# Patient Record
Sex: FEMALE | Race: BLACK OR AFRICAN AMERICAN
[De-identification: names, ages, dates, MRNs, and addresses within clinical notes are randomized per-mention and may not be internally consistent; named-entity substitution may affect disease eponyms.]

---

## 2018-01-17 ENCOUNTER — HOSPITAL ENCOUNTER (INPATIENT)
Dept: HOSPITAL 92 - ERS | Age: 78
LOS: 1 days | Discharge: HOME | DRG: 87 | End: 2018-01-18
Attending: SURGERY | Admitting: SURGERY
Payer: MEDICARE

## 2018-01-17 ENCOUNTER — HOSPITAL ENCOUNTER (EMERGENCY)
Dept: HOSPITAL 18 - NAV ERS | Age: 78
Discharge: ANOTHER HOSPITAL | End: 2018-01-17
Payer: MEDICARE

## 2018-01-17 VITALS — BODY MASS INDEX: 37.6 KG/M2

## 2018-01-17 DIAGNOSIS — Z79.82: ICD-10-CM

## 2018-01-17 DIAGNOSIS — S06.6X0A: Primary | ICD-10-CM

## 2018-01-17 DIAGNOSIS — I10: ICD-10-CM
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
## 2018-01-17 DIAGNOSIS — Z91.19: ICD-10-CM

## 2018-01-17 LAB
ALBUMIN SERPL BCG-MCNC: 4 G/DL (ref 3.4–4.8)
ALP SERPL-CCNC: 84 U/L (ref 40–150)
ALT SERPL W P-5'-P-CCNC: 15 U/L (ref 8–55)
ANION GAP SERPL CALC-SCNC: 15 MMOL/L (ref 10–20)
APTT PPP: 29.5 SEC (ref 22.9–36.1)
AST SERPL-CCNC: 21 U/L (ref 5–34)
BASOPHILS # BLD AUTO: 0.1 THOU/UL (ref 0–0.2)
BASOPHILS NFR BLD AUTO: 0.5 % (ref 0–1)
BILIRUB SERPL-MCNC: 0.9 MG/DL (ref 0.2–1.2)
BUN SERPL-MCNC: 11 MG/DL (ref 9.8–20.1)
CALCIUM SERPL-MCNC: 9.4 MG/DL (ref 7.8–10.44)
CHLORIDE SERPL-SCNC: 105 MMOL/L (ref 98–107)
CK MB SERPL-MCNC: 1.4 NG/ML (ref 0–6.6)
CO2 SERPL-SCNC: 24 MMOL/L (ref 23–31)
CREAT CL PREDICTED SERPL C-G-VRATE: 0 ML/MIN (ref 70–130)
EOSINOPHIL # BLD AUTO: 0.1 THOU/UL (ref 0–0.7)
EOSINOPHIL NFR BLD AUTO: 0.8 % (ref 0–10)
GLOBULIN SER CALC-MCNC: 3.7 G/DL (ref 2.4–3.5)
GLUCOSE SERPL-MCNC: 87 MG/DL (ref 83–110)
HGB BLD-MCNC: 12.7 G/DL (ref 12–16)
INR PPP: 1
LYMPHOCYTES # BLD AUTO: 1.8 THOU/UL (ref 1.2–3.4)
LYMPHOCYTES NFR BLD AUTO: 18.9 % (ref 21–51)
MCH RBC QN AUTO: 31.2 PG (ref 27–31)
MCV RBC AUTO: 96.6 FL (ref 81–99)
MONOCYTES # BLD AUTO: 0.5 THOU/UL (ref 0.11–0.59)
MONOCYTES NFR BLD AUTO: 5.3 % (ref 0–10)
NEUTROPHILS # BLD AUTO: 6.9 THOU/UL (ref 1.4–6.5)
NEUTROPHILS NFR BLD AUTO: 74.4 % (ref 42–75)
PLATELET # BLD AUTO: 275 THOU/UL (ref 130–400)
POTASSIUM SERPL-SCNC: 3.9 MMOL/L (ref 3.5–5.1)
PROTHROMBIN TIME: 13.5 SEC (ref 12–14.7)
RBC # BLD AUTO: 4.07 MILL/UL (ref 4.2–5.4)
SODIUM SERPL-SCNC: 140 MMOL/L (ref 136–145)
TROPONIN I SERPL DL<=0.01 NG/ML-MCNC: (no result) NG/ML (ref ?–0.03)
WBC # BLD AUTO: 9.3 THOU/UL (ref 4.8–10.8)

## 2018-01-17 PROCEDURE — 36415 COLL VENOUS BLD VENIPUNCTURE: CPT

## 2018-01-17 PROCEDURE — 70450 CT HEAD/BRAIN W/O DYE: CPT

## 2018-01-17 PROCEDURE — 83735 ASSAY OF MAGNESIUM: CPT

## 2018-01-17 PROCEDURE — 85025 COMPLETE CBC W/AUTO DIFF WBC: CPT

## 2018-01-17 PROCEDURE — 96375 TX/PRO/DX INJ NEW DRUG ADDON: CPT

## 2018-01-17 PROCEDURE — 80053 COMPREHEN METABOLIC PANEL: CPT

## 2018-01-17 PROCEDURE — G0390 TRAUMA RESPONS W/HOSP CRITI: HCPCS

## 2018-01-17 PROCEDURE — 82553 CREATINE MB FRACTION: CPT

## 2018-01-17 PROCEDURE — 84100 ASSAY OF PHOSPHORUS: CPT

## 2018-01-17 PROCEDURE — 96374 THER/PROPH/DIAG INJ IV PUSH: CPT

## 2018-01-17 PROCEDURE — 84484 ASSAY OF TROPONIN QUANT: CPT

## 2018-01-17 PROCEDURE — 93005 ELECTROCARDIOGRAM TRACING: CPT

## 2018-01-17 PROCEDURE — 85730 THROMBOPLASTIN TIME PARTIAL: CPT

## 2018-01-17 PROCEDURE — 85610 PROTHROMBIN TIME: CPT

## 2018-01-17 PROCEDURE — 80048 BASIC METABOLIC PNL TOTAL CA: CPT

## 2018-01-17 PROCEDURE — 72125 CT NECK SPINE W/O DYE: CPT

## 2018-01-17 PROCEDURE — 82533 TOTAL CORTISOL: CPT

## 2018-01-17 NOTE — CT
NONCONTRAST CT HEAD

1/17/18

 

HISTORY: 

Head Injury after slipping and falling on ice. Patient fell backwards and hit back of head. Mild righ
t sided neck pain and pain to back of head. 

 

COMPARISON:  

None available. 

 

FINDINGS:  

There is increased density seen within a posterior sulcus right frontal lobe at the vertex consistent
 with small amount of subarachnoid hemorrhage. No additional intraparenchymal or extra-axial hemorrha
ge is visualized. 

 

There is no evidence of an acute infarction, mass effect, or midline shift. Ventricular system is nor
mal in size, shape and position for the degree of sulcal atrophy. Calvarial structures are intact, an
d no calvarial fracture is visualized. Minimal mucosal thickening is seen in the posterior right ethm
oidal air cell. Mastoid air cells are clear.

 

IMPRESSION:  

1.      Small amount of subarachnoid hemorrhage posterior right frontal lobe near the vertex. Followu
p evaluation is recommended to evaluate for expected evolutionary changes in blood products. 

2.      Above findings discussed with Dr. Waldron in the Emergency Department on 1/17/18 at 1747 hours
.

 

POS: P[facility]AJ

## 2018-01-17 NOTE — CT
NONCONTRAST CT CERVICAL SPINE:

1/17/18

 

HISTORY: 

Patient fell after slipping on ice and hit back of head on concrete. Patient reports right sided neck
 pain and head pain. 

 

COMPARISON:  

None available. 

 

FINDINGS:  

There is trace anterolisthesis of C4 on C5 likely related to the prominent facet degenerative changes
 at this level. No additional level subluxation is seen, and there is no evidence of a fracture invol
ving the cervical spine. 

 

There are multilevel degenerative changes noted with facet degenerative changes present at several le
vels. There is fusion of the posterior elements on the left at the C3-4 level. 

 

There is straightening of the normal cervical lordotic curvature. Posterior osteophyte formation is p
resent at the C5-6 and C6-7 levels resulting in mild effacement of the ventral subarachnoid space. Sc
attered mild degrees of neural foraminal narrowing are present. 

 

Prevertebral soft tissues are within normal limits.

 

The visualized lung apices are clear aside from minimal pleural and parenchymal scarring. 

 

There is suggestion of a subcentimeter low density nodule in the posterior aspect fight lobe of the t
hyroid gland. However, this is difficult to adequately evaluate on this nonenhanced CT scan. 

 

IMPRESSION: 

1.      No acute fracture involving the cervical spine. 

2.      Trace anterolisthesis of C4 on C5 likely related to the prominent facet degenerative changes.


3.      Multilevel degenerative changes 

4.      Suggestion of a tiny subcentimeter hypodense nodule in the most posterior aspect mid portion 
of the right lobe of the thyroid gland. However, this is difficult to adequately evaluate on this exa
m as there is artifact extending through this region. 

 

POS: PANKAJ

## 2018-01-17 NOTE — CON
DATE OF CONSULTATION:  01/17/2018

 

Roverto Ni PA-C, dictating for David Coates MD

 

This is a 60-minute initial patient consult in which greater than 50% of the exam was spent in counse
ling and coordinating patient's care.  The remainder of the exam was spent in review of patient's med
[facility] records and appropriate imaging studies, and formulation of treatment plan.

 

CHIEF COMPLAINT:  Headache, status post fall.

 

HISTORY OF PRESENT ILLNESS:  Ms. Nunez is a pleasant 77-year-old female who presents with her daugh
ter for the above complaints.  Apparently, the patient was walking to her mailbox earlier today when 
she slipped on a patch of ice striking the back of her head resulting in a frontal headache.  The pat
ient states that she does not typically fall and does not notice any weakness into any of the extremi
ties.  She lives alone at home and is otherwise healthy.  She does have a history of hypertension whi
ch she takes 81 mg of aspirin daily for.  Her daughter accompanies her to the exam and she is a physi
cherelle therapist, who works part time in [employer].  The patient did have an episode of right-
sided neck pain; however, states this is completely resolved at this time.  Due to the mechanism of i
njury, head CT was obtained.  It showed a small right frontal subarachnoid hemorrhage.  Patient is tr
ansferred from [facility].

 

PHYSICAL EXAMINATION:  The patient is awake, alert, and appropriate.  She appears younger than her st
ated age.  Her GCS currently is 15.  She is oriented to person, place, and time.  Her pupils are equa
l, round, and reactive bilaterally.  She has no pronator drift.  She has no worrisome tenderness to p
alpation along the midline of the cervical spine or in the bilateral paraspinal musculature.  She has
 full strength in the bilateral upper and bilateral lower extremities with no worrisome myelopathic f
eatures on exam including negative Cabrera's bilaterally and no increased tone.

 

IMPRESSION/DIAGNOSES:  Status post fall with headache and right frontal subarachnoid hemorrhage.

 

PLAN:  I discussed the patient's case and imaging with Dr. Coates.  At this time, the patient will be
 admitted to the hospital by our trauma colleagues.  I have discussed this with them as well with q.2
 hour neurochecks.  I would like her to be n.p.o. at midnight, although the likely of her going to gregory
rgery is very low.  I discussed CT findings with the patient's daughter at bedside as well.  We will 
plan to repeat the patient's head CT in the morning.  We will follow up on those results.  I have als
o ordered a CT scan of the cervical spine to ensure that there no acute fractures.  I will hold aspir
in and all other blood thinners until patient is cleared by Neurosurgery.  The plan will be, if the C
T scan is stable tomorrow that she will follow up in our clinic in 4 weeks with a noncontrast repeat 
head CT.  Enough opportunity was given to the patient and her daughter to discuss her questions and c
oncerns and they are pleased with this plan.  Please call with any changes in patient's neurologic st
atus.

## 2018-01-17 NOTE — HP
DATE OF ADMISSION:  01/17/2018

 

ATTENDING PHYSICIAN:  Dr. Romel Lagunas.

 

TRAUMA ACTIVATION:  Not applicable.

 

HISTORY OF PRESENT ILLNESS:  Lisy Nunez is a 77-year-old female presented to [facility] as a 
transfer from [place], status post mechanical fall.  Per patient, she was walking outside to check her
 mail, slipped on the ice and fell, hitting the back of her head.  She was seen and evaluated in the 
emergency room of [place] and found to have a subarachnoid hemorrhage.  Neurosurgery was notified.  [facility] was asked to admit.  Upon my evaluation, patient has a chief complaint of frontal heada
genevieve.  She has not yet received any medications for this.

 

PAST MEDICAL HISTORY AND ALLERGIES:  None.

 

HOME MEDICATIONS:  Include aspirin 81 mg.

 

CHRONIC MEDICAL ILLNESSES:  Hypertension.

 

PAST SURGICAL HISTORY:  Significant for hemorrhoid surgery, bilateral cataract surgery, and tubal lig
ation.

 

SOCIAL HISTORY:  Patient lives alone.  She ambulates independently.  She does not use a walker or can
e.  She denies alcohol, tobacco, or illicit drug use.

 

FAMILY HISTORY:  Noncontributory.

 

REVIEW OF SYSTEMS:  Negative except as indicated in the HPI.

 

PHYSICAL EXAMINATION:

VITAL SIGNS:  On evaluation includes heart rate 92, blood pressure 162/74, O2 sat 99% on room air, re
spiratory rate of 18.

GENERAL:  Well-developed female in no acute distress, resting in bed.

HEAD:  Normocephalic, atraumatic.

EYES:  Pupils were PERRL.  Extraocular movements are intact.

NECK:  Supple.  Trachea is midline.  There was no tenderness to palpation.

CHEST/PULMONARY:  Normal work of breathing, symmetric rise.  Lungs are clear to auscultation bilatera
lly.

CARDIOVASCULAR:  Regular rate and rhythm.

GASTROINTESTINAL:  Abdomen is soft, nontender, nondistended.  Bowel sounds are positive.

BACK:  Reported as being within normal limits.

EXTREMITIES:  Bilateral upper extremities within normal limits.  Bilateral lower extremities within n
ormal limits.  Strength 5/5 in all 4 extremities.  Pulses 2+ bilaterally.

NEUROLOGIC:  GCS is 15.  No focal deficits noted.  Sensation is intact.

 

LABORATORY FINDINGS:  WBC 9.3, hemoglobin 12.7, hematocrit 39.3, platelet count of 275.  PTT 29.5, PT
 13.5, INR 1.0.  Sodium 140, potassium 3.9, chloride 105, carbon dioxide 24, BUN of 11, creatinine 0.
81, glucose 87.  AST and ALT within normal limits.  Troponin less than 0.010.

 

RADIOGRAPHIC FINDINGS:  CT of the brain demonstrated a small amount of subarachnoid hemorrhage in the
 posterior right frontal lobe per Radiology.  CT of the C-spine is pending.

 

ASSESSMENT:

1.  Status post mechanical fall.

2.  Subarachnoid hemorrhage.

3.  Headache secondary to above.

4.  Hypertension.

 

PLAN:  Admit to Trauma Services.  Frequent neuro checks per Neurosurgery.  Patient should not have an
ticoagulants or aspirin.  Pain management.  Antihypertensives for SBP less than 150.  Repeat head CT 
in a.m.  Head of bed 30 degrees.  Patient has been seen and evaluated by Neurosurgery at this time.  
Plans for admission were discussed with patient and daughter at her bedside.  All questions were answ
ered at the time of this dictation.  Trauma attending has been notified of admission.

## 2018-01-18 VITALS — DIASTOLIC BLOOD PRESSURE: 71 MMHG | SYSTOLIC BLOOD PRESSURE: 141 MMHG | TEMPERATURE: 98.8 F

## 2018-01-18 LAB
ANION GAP SERPL CALC-SCNC: 12 MMOL/L (ref 10–20)
BASOPHILS # BLD AUTO: 0 THOU/UL (ref 0–0.2)
BASOPHILS NFR BLD AUTO: 0.4 % (ref 0–1)
BUN SERPL-MCNC: 8 MG/DL (ref 9.8–20.1)
CALCIUM SERPL-MCNC: 9 MG/DL (ref 7.8–10.44)
CHLORIDE SERPL-SCNC: 108 MMOL/L (ref 98–107)
CO2 SERPL-SCNC: 24 MMOL/L (ref 23–31)
CREAT CL PREDICTED SERPL C-G-VRATE: 83 ML/MIN (ref 70–130)
EOSINOPHIL # BLD AUTO: 0 THOU/UL (ref 0–0.7)
EOSINOPHIL NFR BLD AUTO: 0.3 % (ref 0–10)
GLUCOSE SERPL-MCNC: 91 MG/DL (ref 83–110)
HGB BLD-MCNC: 12.9 G/DL (ref 12–16)
LYMPHOCYTES # BLD: 1.5 THOU/UL (ref 1.2–3.4)
LYMPHOCYTES NFR BLD AUTO: 15.4 % (ref 21–51)
MAGNESIUM SERPL-MCNC: 2.4 MG/DL (ref 1.6–2.6)
MCH RBC QN AUTO: 32.9 PG (ref 27–31)
MCV RBC AUTO: 99.5 FL (ref 81–99)
MONOCYTES # BLD AUTO: 0.5 THOU/UL (ref 0.11–0.59)
MONOCYTES NFR BLD AUTO: 5 % (ref 0–10)
NEUTROPHILS # BLD AUTO: 7.5 THOU/UL (ref 1.4–6.5)
NEUTROPHILS NFR BLD AUTO: 78.9 % (ref 42–75)
PLATELET # BLD AUTO: 335 THOU/UL (ref 130–400)
POTASSIUM SERPL-SCNC: 4.1 MMOL/L (ref 3.5–5.1)
RBC # BLD AUTO: 3.91 MILL/UL (ref 4.2–5.4)
SODIUM SERPL-SCNC: 140 MMOL/L (ref 136–145)
WBC # BLD AUTO: 9.5 THOU/UL (ref 4.8–10.8)

## 2018-01-18 NOTE — CT
PRELIMINARY REPORT/[facility]/EMERGENCY AFTER

HOURS PROCEDURE:

 

EXAM:

CT Head Without Intravenous Contrast

 

CLINICAL HISTORY:

77 years old, female; Condition or disease; Other: Sah; Patient HX: F/u sah

 

TECHNIQUE:

Axial computed tomography images of the head/brain without intravenous contrast.

 

COMPARISON:

Head CT report dated 01/17/2018

 

FINDINGS:

Small subarachnoid hemorrhage in the posterior right frontal lobe sulci at the convexity

No hydrocephalus, mass effect, midline shift or acute territorial infarct is observed. The calvarium 
is intact. The paranasal sinuses are grossly clear.

 

IMPRESSION:

Grossly stable small right-sided subarachnoid hemorrhage. Direct comparison with prior images would b
e helpful

 

Thank you for allowing us to participate in the care of your patient.

 

Dictated and Authenticated by: Hank Sharma MD

01/18/2018 4:49 AM Central Time ([place] & [place])

 

 

 

 

 

 

FINAL REPORT

CT BRAIN WITHOUT CONTRAST:

 

Date:  01/18/18

 

HISTORY:  

Follow-up subarachnoid hemorrhage. 

 

COMPARISON:  

CT brain dated 01/17/18. 

 

FINDINGS:

Faint interval size increase of the right frontal subarachnoid hemorrhage near the vertex. No other n
ew separate areas of hemorrhage. No midline shift or mass effect. The calvarium is intact. 

 

No infarction. 

 

IMPRESSION: 

Stable to faint interval size increase of the right frontal subarachnoid hemorrhage. No new areas of 
hemorrhage. 

 

Findings and impression are concordant with preliminary report by [facility]. 

 

 

POS: P[facility]

## 2018-01-18 NOTE — PRG
DATE OF SERVICE:  01/18/2018

 

This is a 30-minute initial hospital visit note in which 30 minutes were spent in reviewing the imagi
ng record, evaluation, examination of the patient, and formulation of a plan.  Greater than 50% of 
e time was spent in counseling on Lisy Nunez, 1940.

 

CHIEF COMPLAINT:  Traumatic subarachnoid hemorrhage status post fall on ice.

 

HISTORY OF PRESENT ILLNESS:  I reviewed the notes of my colleague Roverto Ni PA-C and agree with 
its content.  Ms. Nunez is a very pleasant 77-year-old woman.  She fell on ice while getting her ma
il and landed on the right parietal region.  She was brought in and head CT demonstrated a small amou
nt of traumatic subarachnoid hemorrhage.  She takes a baby aspirin for cardiac protection.  She noted
 that her cranial spinal imaging was negative otherwise.  Repeat head CT demonstrated stability in he
r intracranial bleed.  She has no complaints this morning.

 

PHYSICAL EXAMINATION:  She is alert, appropriate.  She has full strength throughout her upper and low
er extremities.  She has no neurological deficits and has a GCS of 15.

 

IMPRESSION AND PLAN:  We will arrange for repeat head CT in 1 month.  I have asked her to abstain fro
m aspirin therapy.  I have given her my card and she will follow up in 1 month.  She may be dismissed
 at any time she meets criteria.

 

DIAGNOSES:  Traumatic subarachnoid hemorrhage status post fall.

## 2018-01-18 NOTE — PRG
DATE OF SERVICE:  01/18/2018

 

SUBJECTIVE:  The patient is hospital day #2 status post ground level fall in which she sustained intr
acranial hemorrhage.  The patient overnight had no issues.  This morning, she had a repeat CT scan wh
ich showed a decreased size of the subarachnoid hemorrhage.  The patient was examined by Neurosurgery
 this morning and felt that she was safe to go home from their standpoint.

 

OBJECTIVE:

VITAL SIGNS:  Temperature 98.8, heart rate 85, blood pressure 147/70, respirations 16, oxygen saturat
ion 99% on room air.

GENERAL:  The patient is resting comfortably, sitting in the chair beside her bed.  She is alert and 
oriented x3.  Rosalva coma scale is 15.  She follows simple commands and appropriate.  The patient's 
chief complaint is headache.

HEENT:  The patient has a small contusion on the posterior aspect of her scalp, otherwise unremarkabl
e.  Eyes:  Extraocular motion intact.  PERRLA bilaterally.  Ears are atraumatic without discharge.  N
ose atraumatic without discharge.  Oropharynx is clear.

NECK:  Nontender.  Trachea is midline.

LUNGS:  Chest is clear to auscultation with good inspiratory and expiratory effort.

HEART:  Regular rate and rhythm.

ABDOMEN:  Soft, flat, nontender with hypoactive bowel sounds.

EXTREMITIES:  Neurovascularly intact x4.  Pulses are 2+.  Capillary refill is less than 3 seconds.

 

LABORATORY DATA:  White blood cell count 9.5, hemoglobin 12.9, hematocrit 38.9, platelet count 335.  
Sodium 140, potassium 4.1, chloride 108, CO2 of 24, BUN 8, creatinine 0.82, glucose is 91.

 

RADIOGRAPHS:  Brain CT without contrast this morning shows a stable small right-sided subarachnoid he
morrhage comparison to previous, the size is smaller.

 

ASSESSMENT AND PLAN:

1.  Status post ground level fall.

2.  Subarachnoid hemorrhage.

3.  Headache secondary to above.

4.  Hypertension.

 

PLAN:  Will be to continue nonnarcotic pain management.  We will advance the patient's diet and most 
likely discharge the patient in the morning.  We will ask case management to help with discharge plan
[place].  The patient was evaluated with Dr. Carcamo this morning during rounds.

## 2018-01-19 NOTE — DIS
DATE OF ADMISSION:  01/17/2018

 

DATE OF DISCHARGE:  01/18/2018

 

ADMISSION DIAGNOSES:

1.  Status post ground level fall.

2.  Subarachnoid hemorrhage.

3.  Headache secondary to above.

4.  Hypertension.

 

CONSULTATIONS:  Neurosurgery, Dr. Coates.

 

PROCEDURES:  None.

 

SUMMARY:  The patient is a 77-year-old woman who sustained a ground level fall in which she suffered 
subarachnoid hemorrhages.  The patient was brought to the Emergency Department, evaluated and was fou
nd to have the above injuries.  The patient was admitted overnight.  A repeat CT in the morning did s
how a smaller subarachnoid hemorrhage and the patient was stable.  Her headaches were controlled with
 non-narcotic pain medication.  She will be discharged to home with family.  The patient will follow 
up with Dr. Coates in his clinic in 3-4 weeks, sooner as needed.  The patient may also follow up with
 Trauma Clinic as needed.

## 2018-02-14 ENCOUNTER — HOSPITAL ENCOUNTER (OUTPATIENT)
Dept: HOSPITAL 92 - TBSIIMAG | Age: 78
Discharge: HOME | End: 2018-02-14
Attending: NEUROLOGICAL SURGERY
Payer: MEDICARE

## 2018-02-14 DIAGNOSIS — I60.9: Primary | ICD-10-CM

## 2018-02-14 PROCEDURE — 70450 CT HEAD/BRAIN W/O DYE: CPT

## 2018-02-14 NOTE — CT
CT OF BRAIN PERFORMED WITHOUT CONTRAST ENHANCEMENT:

 

Date:  02/14/18 

 

HISTORY:  

Follow-up of subarachnoid hemorrhage. 

 

FINDINGS:

The ventricular and cisternal system shows fairly age-appropriate change. The subarachnoid blood over
 the right frontal convexity appears to have resolved. The mastoid air cells and visualized sinuses a
re clear. 

 

IMPRESSION: 

Resolution of the right frontal subarachnoid blood. 

 

 

POS: [facility]